# Patient Record
Sex: FEMALE | Race: WHITE | Employment: FULL TIME | URBAN - METROPOLITAN AREA
[De-identification: names, ages, dates, MRNs, and addresses within clinical notes are randomized per-mention and may not be internally consistent; named-entity substitution may affect disease eponyms.]

---

## 2024-07-16 ENCOUNTER — RA CDI HCC (OUTPATIENT)
Dept: OTHER | Facility: HOSPITAL | Age: 39
End: 2024-07-16

## 2024-07-29 ENCOUNTER — OFFICE VISIT (OUTPATIENT)
Dept: FAMILY MEDICINE CLINIC | Facility: CLINIC | Age: 39
End: 2024-07-29

## 2024-07-29 VITALS
HEIGHT: 64 IN | RESPIRATION RATE: 16 BRPM | BODY MASS INDEX: 25.81 KG/M2 | WEIGHT: 151.2 LBS | TEMPERATURE: 97.8 F | SYSTOLIC BLOOD PRESSURE: 123 MMHG | HEART RATE: 72 BPM | DIASTOLIC BLOOD PRESSURE: 82 MMHG | OXYGEN SATURATION: 100 %

## 2024-07-29 DIAGNOSIS — G56.02 CARPAL TUNNEL SYNDROME OF LEFT WRIST: ICD-10-CM

## 2024-07-29 DIAGNOSIS — Z13.6 SCREENING FOR CARDIOVASCULAR CONDITION: ICD-10-CM

## 2024-07-29 DIAGNOSIS — K02.9 DENTAL CAVITY: ICD-10-CM

## 2024-07-29 DIAGNOSIS — Z00.00 ANNUAL PHYSICAL EXAM: Primary | ICD-10-CM

## 2024-07-29 PROCEDURE — 99385 PREV VISIT NEW AGE 18-39: CPT | Performed by: FAMILY MEDICINE

## 2024-07-29 PROCEDURE — 3725F SCREEN DEPRESSION PERFORMED: CPT | Performed by: FAMILY MEDICINE

## 2024-07-29 RX ORDER — IBUPROFEN 200 MG
200 TABLET ORAL EVERY 6 HOURS PRN
COMMUNITY

## 2024-07-29 NOTE — PROGRESS NOTES
Adult Annual Physical  Name: Azeb Christopher      : 1985      MRN: [id number]  Encounter Provider: Shantal Byrd MD  Encounter Date: 2024   Encounter department: [facility] [place]    Assessment & Plan   1. Annual physical exam  Assessment & Plan:  Immunizations and preventive care screenings were discussed with patient today. Appropriate education was printed on patient's after visit summary.    Counseling:  Alcohol/drug use: discussed moderation in alcohol intake, the recommendations for healthy alcohol use, and avoidance of illicit drug use.  Dental Health: discussed importance of regular tooth brushing, flossing, and dental visits.  Injury prevention: discussed safety/seat belts, safety helmets, smoke detectors, carbon dioxide detectors, and smoking near bedding or upholstery.  Sexual health: discussed sexually transmitted diseases, partner selection, use of condoms, avoidance of unintended pregnancy, and contraceptive alternatives.  Exercise: the importance of regular exercise/physical activity was discussed. Recommend exercise 3-5 times per week for at least 30 minutes.   2. Carpal tunnel syndrome of left wrist  Assessment & Plan:  Patient having left wrist pain when flexing and hyperextending the wrist  Pain is worse when carrying heavy objects   Patient denies any injury to the wrist, numbness, or tingling   On exam, tinnel and phalen signs positive. No atrophy noted.  Patient advised to use a soft cushion under her wrist when typing at the computer and to wear a wrist brace at night   Follow up in 2 weeks  3. Dental cavity  Assessment & Plan:  Patient with history of dental cavities asking for referral to start community health  Orders:  -     Ambulatory referral to [facility]; Future  4. Screening for cardiovascular condition  -     CBC and differential; Future  -     Basic metabolic panel; Future  -     CBC and differential  -      Basic metabolic panel             History of Present Illness     Adult Annual Physical:  Patient presents for annual physical. Wrist pain and wakes up migraines about once a month. Working on building a support system.     Diet and Physical Activity:  - Diet/Nutrition: limited junk food, consuming 3-5 servings of fruits/vegetables daily, well balanced diet and adequate whole grain intake. trying to lose weight  - Exercise: walking, 5-7 times a week on average and 30-60 minutes on average.    Depression Screening:  - PHQ-2 Score: 2    General Health:  - Sleep: sleeps poorly, 4-6 hours of sleep on average, unrefreshing sleep and daytime hypersomnolence. drinks 6-8 cups of coffee a day. trouble falling asleep  - Hearing: normal hearing right ear and normal hearing left ear.  - Vision: wears glasses and contacts and goes for regular eye exams.  - Dental: regular dental visits and brushes teeth twice daily. struggling to find a dentist accepting patients here    /GYN Health:  - Follows with GYN: no.   - Menopause: premenopausal.   - Last menstrual cycle: 7/7/2024.   - History of STDs: no  - Contraception: barrier methods.      Advanced Care Planning:  - Has an advanced directive?: no    - Has a durable medical POA?: no    - ACP document given to patient?: no      Review of Systems   Constitutional:  Negative for activity change, appetite change, chills, fatigue and unexpected weight change.   HENT:  Negative for congestion.    Respiratory:  Negative for chest tightness and wheezing.    Cardiovascular:  Negative for chest pain.   Gastrointestinal:  Negative for abdominal distention and abdominal pain.   Endocrine: Negative for cold intolerance, heat intolerance and polyuria.   Genitourinary:  Negative for menstrual problem.   Neurological:  Negative for dizziness, tremors and headaches.     Current Outpatient Medications on File Prior to Visit   Medication Sig Dispense Refill    ibuprofen (MOTRIN) 200 mg tablet Take 200  "mg by mouth every 6 (six) hours as needed       No current facility-administered medications on file prior to visit.      Social History     Tobacco Use    Smoking status: Never     Passive exposure: Never    Smokeless tobacco: Never   Substance and Sexual Activity    Alcohol use: Yes     Alcohol/week: 1.0 standard drink of alcohol     Types: 1 Glasses of wine per week    Drug use: Never    Sexual activity: Not on file       Objective     /82   Pulse 72   Temp 97.8 °F (36.6 °C)   Resp 16   Ht 5' 4.4\" (1.636 m)   Wt 68.6 kg (151 lb 3.2 oz)   SpO2 100%   BMI 25.63 kg/m²     Physical Exam  Vitals and nursing note reviewed.   Constitutional:       General: She is not in acute distress.     Appearance: She is well-developed.   HENT:      Nose: No congestion.   Cardiovascular:      Rate and Rhythm: Normal rate and regular rhythm.      Heart sounds: No murmur heard.  Pulmonary:      Effort: Pulmonary effort is normal. No respiratory distress.      Breath sounds: Normal breath sounds.   Abdominal:      Palpations: Abdomen is soft.      Tenderness: There is no abdominal tenderness.   Musculoskeletal:         General: No swelling.   Skin:     General: Skin is warm and dry.      Capillary Refill: Capillary refill takes less than 2 seconds.   Neurological:      Mental Status: She is alert.   Psychiatric:         Mood and Affect: Mood normal.       Administrative Statements   I have spent a total time of 30 minutes in caring for this patient on the day of the visit/encounter including Diagnostic results, Risks and benefits of tx options, Instructions for management, Importance of tx compliance, Reviewing / ordering tests, medicine, procedures  , and Obtaining or reviewing history  .  "

## 2024-07-29 NOTE — ASSESSMENT & PLAN NOTE
Immunizations and preventive care screenings were discussed with patient today. Appropriate education was printed on patient's after visit summary.    Counseling:  Alcohol/drug use: discussed moderation in alcohol intake, the recommendations for healthy alcohol use, and avoidance of illicit drug use.  Dental Health: discussed importance of regular tooth brushing, flossing, and dental visits.  Injury prevention: discussed safety/seat belts, safety helmets, smoke detectors, carbon dioxide detectors, and smoking near bedding or upholstery.  Sexual health: discussed sexually transmitted diseases, partner selection, use of condoms, avoidance of unintended pregnancy, and contraceptive alternatives.  Exercise: the importance of regular exercise/physical activity was discussed. Recommend exercise 3-5 times per week for at least 30 minutes.

## 2024-07-29 NOTE — ASSESSMENT & PLAN NOTE
Patient having left wrist pain when flexing and hyperextending the wrist  Pain is worse when carrying heavy objects   Patient denies any injury to the wrist, numbness, or tingling   On exam, tinnel and phalen signs positive. No atrophy noted.  Patient advised to use a soft cushion under her wrist when typing at the computer and to wear a wrist brace at night   Follow up in 2 weeks   no

## 2024-08-18 LAB
BASOPHILS # BLD AUTO: 62 CELLS/UL (ref 0–200)
BASOPHILS NFR BLD AUTO: 1 %
BUN SERPL-MCNC: 11 MG/DL (ref 7–25)
BUN/CREAT SERPL: NORMAL (CALC) (ref 6–22)
CALCIUM SERPL-MCNC: 9.1 MG/DL (ref 8.6–10.2)
CHLORIDE SERPL-SCNC: 106 MMOL/L (ref 98–110)
CO2 SERPL-SCNC: 28 MMOL/L (ref 20–32)
CREAT SERPL-MCNC: 0.83 MG/DL (ref 0.5–0.97)
EOSINOPHIL # BLD AUTO: 130 CELLS/UL (ref 15–500)
EOSINOPHIL NFR BLD AUTO: 2.1 %
ERYTHROCYTE [DISTWIDTH] IN BLOOD BY AUTOMATED COUNT: 13.1 % (ref 11–15)
GFR/BSA.PRED SERPLBLD CYS-BASED-ARV: 92 ML/MIN/1.73M2
GLUCOSE SERPL-MCNC: 88 MG/DL (ref 65–99)
HCT VFR BLD AUTO: 37.9 % (ref 35–45)
HGB BLD-MCNC: 12.2 G/DL (ref 11.7–15.5)
LYMPHOCYTES # BLD AUTO: 1841 CELLS/UL (ref 850–3900)
LYMPHOCYTES NFR BLD AUTO: 29.7 %
MCH RBC QN AUTO: 29.7 PG (ref 27–33)
MCHC RBC AUTO-ENTMCNC: 32.2 G/DL (ref 32–36)
MCV RBC AUTO: 92.2 FL (ref 80–100)
MONOCYTES # BLD AUTO: 533 CELLS/UL (ref 200–950)
MONOCYTES NFR BLD AUTO: 8.6 %
NEUTROPHILS # BLD AUTO: 3633 CELLS/UL (ref 1500–7800)
NEUTROPHILS NFR BLD AUTO: 58.6 %
PLATELET # BLD AUTO: 350 THOUSAND/UL (ref 140–400)
PMV BLD REES-ECKER: 9.4 FL (ref 7.5–12.5)
POTASSIUM SERPL-SCNC: 4.5 MMOL/L (ref 3.5–5.3)
RBC # BLD AUTO: 4.11 MILLION/UL (ref 3.8–5.1)
SODIUM SERPL-SCNC: 139 MMOL/L (ref 135–146)
WBC # BLD AUTO: 6.2 THOUSAND/UL (ref 3.8–10.8)

## 2024-08-22 ENCOUNTER — ANNUAL EXAM (OUTPATIENT)
Dept: FAMILY MEDICINE CLINIC | Facility: CLINIC | Age: 39
End: 2024-08-22

## 2024-08-22 VITALS
SYSTOLIC BLOOD PRESSURE: 113 MMHG | TEMPERATURE: 98.6 F | WEIGHT: 151.2 LBS | OXYGEN SATURATION: 100 % | HEART RATE: 71 BPM | BODY MASS INDEX: 25.81 KG/M2 | RESPIRATION RATE: 18 BRPM | DIASTOLIC BLOOD PRESSURE: 74 MMHG | HEIGHT: 64 IN

## 2024-08-22 DIAGNOSIS — Z11.3 SCREENING EXAMINATION FOR STD (SEXUALLY TRANSMITTED DISEASE): Primary | ICD-10-CM

## 2024-08-22 DIAGNOSIS — N89.8 VAGINAL DISCHARGE: ICD-10-CM

## 2024-08-22 DIAGNOSIS — Z01.419 WELL WOMAN EXAM WITH ROUTINE GYNECOLOGICAL EXAM: ICD-10-CM

## 2024-08-22 PROCEDURE — 87480 CANDIDA DNA DIR PROBE: CPT

## 2024-08-22 PROCEDURE — G0145 SCR C/V CYTO,THINLAYER,RESCR: HCPCS

## 2024-08-22 PROCEDURE — 99395 PREV VISIT EST AGE 18-39: CPT | Performed by: FAMILY MEDICINE

## 2024-08-22 PROCEDURE — G0476 HPV COMBO ASSAY CA SCREEN: HCPCS

## 2024-08-22 PROCEDURE — 87491 CHLMYD TRACH DNA AMP PROBE: CPT

## 2024-08-22 PROCEDURE — 87660 TRICHOMONAS VAGIN DIR PROBE: CPT

## 2024-08-22 PROCEDURE — 87510 GARDNER VAG DNA DIR PROBE: CPT

## 2024-08-22 PROCEDURE — 87591 N.GONORRHOEAE DNA AMP PROB: CPT

## 2024-08-22 NOTE — PROGRESS NOTES
"Francis Christopher is a 39 y.o. female who presents for annual well woman exam. Periods are regular every 287-30 days, lasting 5 -6 days. LMP 24.    GYN:  No vaginal discharge, labial erythema or lesions, dyspareunia.  Periods are regular every 287-30 days, lasting 5 -6 days. LMP 24.  Contraception: condoms.  Patient is not sexually active currently; no sexual activity since May   Gynecologic surgery: none    OB:   female. No children pregnancies     :  no dysuria, urinary frequency or urgency.  np hematuria, flank pain, incontinence.    Breast:  no breast mass, skin changes, dimpling, reddening, nipple retraction.  no breast discharge.  Not due for mammogram. No breast imaging   Patient does have a family history of breast cancer in maternal grandmother. No family history of endometrial, or ovarian cancer. Paternal grandmother with colon cancer      General:  Diet/Nutrition: limited junk food, consuming 3-5 servings of fruits/vegetables daily, well balanced diet and adequate whole grain intake. trying to lose weight  Exercise: walking, 5-7 times a week on average and 30-60 minutes on average.  Work: [employer] at [employer]   ETOH use: Occasional alcohol   Tobacco use: none  Recreational drug use: none    Screening:  Cervical cancer: last pap smear in 10/2018 at a clinic in [place]. Per patient showed low-grade and Hpv was negative .  Not due for mammogram. No breast imaging   Colon cancer: not done; never had any screenings   STD's/STI hx: none.    Review of Systems  Pertinent items are noted in HPI.      Objective      /74 (BP Location: Left arm, Patient Position: Sitting, Cuff Size: Standard)   Pulse 71   Temp 98.6 °F (37 °C) (Temporal)   Resp 18   Ht 5' 4.4\" (1.636 m)   Wt 68.6 kg (151 lb 3.2 oz)   SpO2 100%   BMI 25.63 kg/m²          Patient seen with Medical student Charo Anders;    Patient appears well and is not in distress   Neck is supple without masses  Breasts are " symmetrical without mass, tenderness, nipple discharge, skin changes or adenopathy.   Abdomen is soft and nontender without masses.   External genitals are normal without lesions or rashes.  Urethral meatus and urethra are normal  Vagina is normal without discharge or bleeding.   Cervix is normal without discharge or lesion.   Uterus, adnexa and bladder exam was deferred by patient        Assessment     Azeb Christopher is a 39 y.o. No obstetric history on file. with normal gynecologic exam.     Plan   30-39  1.  Routine well woman exam done today.  2.  Pap and HPV:Pap with HPV was done today.  Current ASCCP Guidelines reviewed.   3.  The patient request's STD testing.  chlamydia, gonorrhea, and trichomonas testing performed. Safe sex practices have been discussed.  4. The patient is not sexually active. She use barrier methods (condoms) for contraception and options have been discussed.    5. The following were reviewed in today's visit: breast self exam, STD testing, HIV risk factors and prevention, use and side effects of OCPs, adequate intake of calcium and vitamin D, exercise, and healthy diet.  6.  Reports vaginal discharge affirm ordered in addition to G/C.  HIV and hep C testing held off per pt request.  Patient to return to office in 12 months for annual physical .           Shantal Byrd  PGY 3 FM

## 2024-08-26 ENCOUNTER — TELEPHONE (OUTPATIENT)
Dept: FAMILY MEDICINE CLINIC | Facility: CLINIC | Age: 39
End: 2024-08-26

## 2024-08-26 DIAGNOSIS — B96.89 BV (BACTERIAL VAGINOSIS): Primary | ICD-10-CM

## 2024-08-26 DIAGNOSIS — N76.0 BV (BACTERIAL VAGINOSIS): Primary | ICD-10-CM

## 2024-08-26 LAB
C TRACH DNA SPEC QL NAA+PROBE: NEGATIVE
CANDIDA RRNA VAG QL PROBE: NOT DETECTED
G VAGINALIS RRNA GENITAL QL PROBE: DETECTED
HPV HR 12 DNA CVX QL NAA+PROBE: NEGATIVE
HPV16 DNA CVX QL NAA+PROBE: NEGATIVE
HPV18 DNA CVX QL NAA+PROBE: NEGATIVE
N GONORRHOEA DNA SPEC QL NAA+PROBE: NEGATIVE
T VAGINALIS RRNA GENITAL QL PROBE: NOT DETECTED

## 2024-08-26 RX ORDER — METRONIDAZOLE 500 MG/1
500 TABLET ORAL EVERY 12 HOURS SCHEDULED
Qty: 14 TABLET | Refills: 0 | Status: SHIPPED | OUTPATIENT
Start: 2024-08-26 | End: 2024-09-02

## 2024-08-28 PROBLEM — Z13.6 SCREENING FOR CARDIOVASCULAR CONDITION: Status: RESOLVED | Noted: 2024-07-29 | Resolved: 2024-08-28

## 2024-08-28 LAB
LAB AP GYN PRIMARY INTERPRETATION: NORMAL
Lab: NORMAL

## 2025-04-18 ENCOUNTER — TRANSCRIBE ORDERS (OUTPATIENT)
Facility: HOSPITAL | Age: 40
End: 2025-04-18

## 2025-04-18 DIAGNOSIS — Z13.9 SCREENING FOR UNSPECIFIED CONDITION: Primary | ICD-10-CM

## 2025-04-21 ENCOUNTER — APPOINTMENT (OUTPATIENT)
Facility: HOSPITAL | Age: 40
End: 2025-04-21